# Patient Record
Sex: FEMALE | Race: WHITE | NOT HISPANIC OR LATINO | Employment: FULL TIME | URBAN - METROPOLITAN AREA
[De-identification: names, ages, dates, MRNs, and addresses within clinical notes are randomized per-mention and may not be internally consistent; named-entity substitution may affect disease eponyms.]

---

## 2019-10-24 ENCOUNTER — TELEPHONE (OUTPATIENT)
Dept: MATERNAL FETAL MEDICINE | Facility: CLINIC | Age: 29
End: 2019-10-24

## 2019-10-24 DIAGNOSIS — Q27.0 TWO VESSEL UMBILICAL CORD: ICD-10-CM

## 2019-10-24 DIAGNOSIS — Z36.89 ENCOUNTER FOR FETAL ANATOMIC SURVEY: Primary | ICD-10-CM

## 2019-10-24 NOTE — TELEPHONE ENCOUNTER
Called to schedule patient for appt. No answer at this time. LM for patient to return call to [phone]

## 2019-11-12 ENCOUNTER — TELEPHONE (OUTPATIENT)
Dept: MATERNAL FETAL MEDICINE | Facility: CLINIC | Age: 29
End: 2019-11-12

## 2019-11-12 NOTE — TELEPHONE ENCOUNTER
Reminder call made to patient. No answer at this time. LM for patient to arrive at 1315. Instructed her to call [phone] for any questions.

## 2019-11-13 ENCOUNTER — OFFICE VISIT (OUTPATIENT)
Dept: MATERNAL FETAL MEDICINE | Facility: CLINIC | Age: 29
End: 2019-11-13
Payer: COMMERCIAL

## 2019-11-13 ENCOUNTER — TELEPHONE (OUTPATIENT)
Dept: PEDIATRIC CARDIOLOGY | Facility: CLINIC | Age: 29
End: 2019-11-13

## 2019-11-13 VITALS
DIASTOLIC BLOOD PRESSURE: 70 MMHG | WEIGHT: 237 LBS | HEIGHT: 60 IN | SYSTOLIC BLOOD PRESSURE: 124 MMHG | BODY MASS INDEX: 46.53 KG/M2

## 2019-11-13 DIAGNOSIS — Q27.0 TWO VESSEL UMBILICAL CORD: ICD-10-CM

## 2019-11-13 DIAGNOSIS — D25.9 UTERINE FIBROID IN PREGNANCY: ICD-10-CM

## 2019-11-13 DIAGNOSIS — O09.899 SINGLE UMBILICAL ARTERY AFFECTING MANAGEMENT OF MOTHER IN SINGLETON PREGNANCY, ANTEPARTUM: ICD-10-CM

## 2019-11-13 DIAGNOSIS — O34.10 UTERINE FIBROID IN PREGNANCY: ICD-10-CM

## 2019-11-13 DIAGNOSIS — Z36.89 ENCOUNTER FOR FETAL ANATOMIC SURVEY: ICD-10-CM

## 2019-11-13 DIAGNOSIS — Z36.89 ENCOUNTER FOR ULTRASOUND TO ASSESS FETAL GROWTH: Primary | ICD-10-CM

## 2019-11-13 PROCEDURE — 3008F BODY MASS INDEX DOCD: CPT | Mod: ,,, | Performed by: OBSTETRICS & GYNECOLOGY

## 2019-11-13 PROCEDURE — 76811 PR US, OB FETAL EVAL & EXAM, TRANSABDOM,FIRST GESTATION: ICD-10-PCS | Mod: ,,, | Performed by: OBSTETRICS & GYNECOLOGY

## 2019-11-13 PROCEDURE — 99202 OFFICE O/P NEW SF 15 MIN: CPT | Mod: 25,,, | Performed by: OBSTETRICS & GYNECOLOGY

## 2019-11-13 PROCEDURE — 76811 OB US DETAILED SNGL FETUS: CPT | Mod: ,,, | Performed by: OBSTETRICS & GYNECOLOGY

## 2019-11-13 PROCEDURE — 3008F PR BODY MASS INDEX (BMI) DOCUMENTED: ICD-10-PCS | Mod: ,,, | Performed by: OBSTETRICS & GYNECOLOGY

## 2019-11-13 PROCEDURE — 99202 PR OFFICE/OUTPT VISIT, NEW, LEVL II, 15-29 MIN: ICD-10-PCS | Mod: 25,,, | Performed by: OBSTETRICS & GYNECOLOGY

## 2019-11-13 NOTE — TELEPHONE ENCOUNTER
Spoke with patient via telephone. Fetal echo booked for 11/22 @ 115 pm [facility]. Office number and address verified.

## 2019-11-13 NOTE — LETTER
November 13, 2019      Rosalba Palumbo MD  [address] [facility] [place] [address]           [facility]  [address] , [address]  [place] [address]  Phone: [phone]          Patient: Jimena Rosa   MR Number: [id number]   YOB: 1990   Date of Visit: 11/13/2019       Dear Dr. Rosalba Palumbo:    Thank you for referring Jimena Rosa to me for evaluation. Attached you will find relevant portions of my assessment and plan of care.    If you have questions, please do not hesitate to call me. I look forward to following Jimena Rosa along with you.    Sincerely,    Sage Carrasco MD    Enclosure  CC:  No Recipients    If you would like to receive this communication electronically, please contact [email or web address] or [phone] to request more information on [facility] access.    For providers and/or their staff who would like to refer a patient to [facility], please contact us through our one-stop-shop provider referral line, [facility], at [phone].    If you feel you have received this communication in error or would no longer like to receive these types of communications, please e-mail [email or web address]

## 2019-11-13 NOTE — PROGRESS NOTES
Chief complaint: SUA, Fibroids in pregnancy    Provider requesting consultation: Dr. Palumbo    28 y.o. at 25w3d EGA     PMH:  Past Medical History:   Diagnosis Date    ADHD (attention deficit hyperactivity disorder)     Asthma     Depression     Fibroid        PObHx:  OB History    Para Term  AB Living   1             SAB TAB Ectopic Multiple Live Births                  # Outcome Date GA Lbr Jayson/2nd Weight Sex Delivery Anes PTL Lv   1 Current                PSH:History reviewed. No pertinent surgical history.    Family history:family history includes Cancer in her paternal grandmother; Diabetes in her maternal grandfather; Hypertension in her maternal grandfather; Hypothyroidism in her mother.    Social history: reports that she quit smoking about 5 years ago. She quit after 8.00 years of use. She has never used smokeless tobacco. She reports that she does not drink alcohol or use drugs.    A detailed fetal anatomical ultrasound was completed today.  See details in imaging section of [facility].    The patient was seen because of a Single Umbilical Artery noted on outside ultrasound and during her ultrasound at today's visit.   No other structural abnormalities were noted. Please see the ultrasound report for a full report.    Single umbilical artery occurs in approximately 0.5% to 1.0% of pregnancies. We discussed that the vessels of the umbilical cord form early in pregnancy between the 13th and 38th days after conception. The most common explanation for a single umbilical artery is secondary atrophy of one of the arteries. SUA can be associated with other congenital anomalies, genetic syndromes, or chromosome abnormalities. We discussed that the finding of a BURRIS A with no other congenital anomalies in the fetus is not associated with an increased risk of chromosomal abnormalities: therefore, invasive prenatal testing is not indicated. There have been reports of an increased incidence of congenital  heart defects when a SUA is seen; therefore, fetal echocardiography is warranted. Also, approximately 7-10% of fetuses with an SUA and no other abnormality seen on prenatal US have an anomaly discovered after birth. Therefore it was recommended that the baby be evaluated after birth for any abnormality.  Records of the patient's visits will be sent to the pediatrician of her choice. Fetuses with a SUA can also have IUGR, therefore this pregnancy should be followed with serial US's for fetal growth.    She has been scheduled for a follow-up ultrasound in 5 weeks and for a fetal echocardiogram in 2 weeks.    We also discussed her fibroid uterus. The patient was counseled that fibroids may remain stable or increase in size.  The patient was counseled that some fibroids may undergo degeneration and cause severe abdominal pain and require hospitalization for pain control.  The patient was counseled that large fibroids may be associated with obstruction to labor or fetal malpresentation.  The patient was counseled that retroplacental fibroids may be associated with fetal growth restriction (FGR).  I recommend that the patient have serial fetal growth ultrasounds every 4 weeks, starting at approximately 28-32 weeks.     The approximate physician face-to-face time was 15 minutes. The majority of the time (>50%) was spent on counseling of the patient or coordination of care.  The patient was given an opportunity to ask questions about management and the diease process.  She expressed an understanding of and agreement to the above impression and plan. All questions were answered to her satisfaction.  She was given contact information to the [facility] clinic to address further concerns.

## 2019-11-19 DIAGNOSIS — O99.212 OBESITY AFFECTING PREGNANCY IN SECOND TRIMESTER: Primary | ICD-10-CM

## 2019-11-22 ENCOUNTER — OFFICE VISIT (OUTPATIENT)
Dept: PEDIATRIC CARDIOLOGY | Facility: CLINIC | Age: 29
End: 2019-11-22
Payer: COMMERCIAL

## 2019-11-22 VITALS
HEIGHT: 60 IN | DIASTOLIC BLOOD PRESSURE: 60 MMHG | SYSTOLIC BLOOD PRESSURE: 127 MMHG | WEIGHT: 234.81 LBS | HEART RATE: 90 BPM | BODY MASS INDEX: 46.1 KG/M2

## 2019-11-22 DIAGNOSIS — Z03.73 FETAL ANOMALY SUSPECTED BUT NOT FOUND: ICD-10-CM

## 2019-11-22 DIAGNOSIS — O09.899 SINGLE UMBILICAL ARTERY AFFECTING MANAGEMENT OF MOTHER IN SINGLETON PREGNANCY, ANTEPARTUM: Primary | ICD-10-CM

## 2019-11-22 PROCEDURE — 3008F BODY MASS INDEX DOCD: CPT | Mod: ,,, | Performed by: PEDIATRICS

## 2019-11-22 PROCEDURE — 3008F PR BODY MASS INDEX (BMI) DOCUMENTED: ICD-10-PCS | Mod: ,,, | Performed by: PEDIATRICS

## 2019-11-22 PROCEDURE — 99203 OFFICE O/P NEW LOW 30 MIN: CPT | Mod: 25,,, | Performed by: PEDIATRICS

## 2019-11-22 PROCEDURE — 99203 PR OFFICE/OUTPT VISIT, NEW, LEVL III, 30-44 MIN: ICD-10-PCS | Mod: 25,,, | Performed by: PEDIATRICS

## 2019-11-22 NOTE — PROGRESS NOTES
[facility] - Pediatric Cardiology Fetal Cardiology Clinic    Today, I had the pleasure of evaluating Jimena Rosa who is now 28 y.o. and carrying her first pregnancy at 26 5/7 weeks gestation with an AMELIA of 20. She was referred for evaluation of the fetal heart due history of a two vessel umbilical cord.      She is carrying a female fetus, yet unnamed.      Obstetric History:    .  Her OB care is by Dr. Rosalba Palumbo.  Her MFM care is by Dr. Carrasco.    Past Medical History:   Diagnosis Date    ADHD (attention deficit hyperactivity disorder)     Asthma     Depression     Fibroid          Current Outpatient Medications:     prenatal vit/iron fum/folic ac (PRENATAL 1+1 ORAL), Take by mouth., Disp: , Rfl:     albuterol (VENTOLIN HFA) 90 mcg/actuation inhaler, Inhale 2 puffs into the lungs every 6 (six) hours as needed for Wheezing. Rescue, Disp: 18 g, Rfl: 0    cefdinir (OMNICEF) 300 MG capsule, , Disp: , Rfl:     desvenlafaxine succinate (PRISTIQ) 100 MG Tb24, Take 1 tablet (100 mg total) by mouth once daily. (Patient not taking: Reported on 2019), Disp: 90 tablet, Rfl: 3    fluticasone-vilanterol (BREO) 200-25 mcg/dose DsDv diskus inhaler, Inhale 1 puff into the lungs once daily. (Patient not taking: Reported on 2019), Disp: 1 each, Rfl: 5    methylphenidate HCl (RITALIN) 20 MG tablet, Take 1 tablet (20 mg total) by mouth 3 (three) times daily with meals. (Patient not taking: Reported on 2019), Disp: 90 tablet, Rfl: 0    methylphenidate HCl (RITALIN) 20 MG tablet, Take 1 tablet (20 mg total) by mouth 3 (three) times daily with meals. (Patient not taking: Reported on 2019), Disp: 90 tablet, Rfl: 0    methylphenidate HCl (RITALIN) 20 MG tablet, Take 1 tablet (20 mg total) by mouth 3 (three) times daily with meals. RITALIN 20 MG TABS (Patient not taking: Reported on 2019), Disp: 90 tablet, Rfl: 0    Family History: Negative for congenital heart disease, early  coronary artery disease, sudden unexplained death, connective tissues disorders, genetic syndromes, multiple miscarriages or other congenital anomalies.    Social History: Ms. Rosa is single. The father of the baby is not involved.     FETAL ECHOCARDIOGRAM (summary):  Fetal echocardiogram at 26 5/7 weeks gestation for a history of a two vessel umbilical cord. AMELIA 20.  Normally connected heart.  No ectopy or sustained arrhythmia demonstrated throughout the study.  Normal fetal atrial and ductal level shunting.  No ventricular level shunting.  Normal atrioventricular and semilunar valve structure and function.  Normal ductal and aortic arches.  Normal biventricular size and systolic function.  No pericardial effusion.  (Full report in electronic medical record)    Impression:  Single active female fetus at 26 wga.  Normal fetal echocardiogram.      Todays fetal echocardiogram is normal, within the limitations of fetal echocardiography.  I discussed with her that fetal echocardiography is insufficiently sensitive to rule out all septal defects, anomalies of pulmonary and systemic veins, arch anomalies, and some valvar abnormalities, nor can it ensure that the ductus arteriosus and foramen ovale will spontaneously close.     Recommendations:  Location, timing, and mode of delivery will be determined by the obstetrical team.  She does not require further follow-up in the fetal echocardiography clinic, but I would be happy to see her again if additional questions or concerns arise.    Should there be any concerns about the baby's heart after birth, a post- echocardiogram and cardiology consultation are recommended.     The above information was discussed in detail including the use of diagrams, with 30 minutes of total face to face time, with greater than 50% with counseling and coordination of care.  The discussion of the diagnosis and treatment options is as described above.      Gunner Rodrigues MD,  MPH  Pediatric and Fetal Cardiology  [facility]   [address] [place] [address]    Office: [phone]  Cell: [phone]

## 2019-12-17 ENCOUNTER — PROCEDURE VISIT (OUTPATIENT)
Dept: MATERNAL FETAL MEDICINE | Facility: CLINIC | Age: 29
End: 2019-12-17
Payer: COMMERCIAL

## 2019-12-17 VITALS — SYSTOLIC BLOOD PRESSURE: 117 MMHG | WEIGHT: 237 LBS | DIASTOLIC BLOOD PRESSURE: 59 MMHG | BODY MASS INDEX: 46.29 KG/M2

## 2019-12-17 DIAGNOSIS — D25.9 UTERINE FIBROID IN PREGNANCY: ICD-10-CM

## 2019-12-17 DIAGNOSIS — O09.899 TWO VESSEL UMBILICAL CORD IN SINGLETON PREGNANCY, ANTEPARTUM: ICD-10-CM

## 2019-12-17 DIAGNOSIS — Q27.0 TWO VESSEL UMBILICAL CORD: ICD-10-CM

## 2019-12-17 DIAGNOSIS — Z36.89 ENCOUNTER FOR ULTRASOUND TO ASSESS FETAL GROWTH: ICD-10-CM

## 2019-12-17 DIAGNOSIS — O34.10 UTERINE FIBROID IN PREGNANCY: ICD-10-CM

## 2019-12-17 PROCEDURE — 76816 OB US FOLLOW-UP PER FETUS: CPT | Mod: ,,, | Performed by: OBSTETRICS & GYNECOLOGY

## 2019-12-17 PROCEDURE — 76816 PR  US,PREGNANT UTERUS,F/U,TRANSABD APP: ICD-10-PCS | Mod: ,,, | Performed by: OBSTETRICS & GYNECOLOGY

## 2023-10-13 ENCOUNTER — OFFICE VISIT (OUTPATIENT)
Dept: URGENT CARE | Facility: CLINIC | Age: 33
End: 2023-10-13
Payer: COMMERCIAL

## 2023-10-13 VITALS
BODY MASS INDEX: 39.27 KG/M2 | DIASTOLIC BLOOD PRESSURE: 78 MMHG | RESPIRATION RATE: 18 BRPM | SYSTOLIC BLOOD PRESSURE: 126 MMHG | TEMPERATURE: 99 F | HEIGHT: 60 IN | HEART RATE: 86 BPM | WEIGHT: 200 LBS | OXYGEN SATURATION: 97 %

## 2023-10-13 DIAGNOSIS — L25.9 CONTACT DERMATITIS, UNSPECIFIED CONTACT DERMATITIS TYPE, UNSPECIFIED TRIGGER: Primary | ICD-10-CM

## 2023-10-13 PROCEDURE — 99203 OFFICE O/P NEW LOW 30 MIN: CPT | Mod: S$GLB,,, | Performed by: NURSE PRACTITIONER

## 2023-10-13 PROCEDURE — 99203 PR OFFICE/OUTPT VISIT, NEW, LEVL III, 30-44 MIN: ICD-10-PCS | Mod: S$GLB,,, | Performed by: NURSE PRACTITIONER

## 2023-10-13 RX ORDER — PREDNISONE 10 MG/1
10 TABLET ORAL DAILY
Qty: 4 TABLET | Refills: 0 | Status: SHIPPED | OUTPATIENT
Start: 2023-10-13 | End: 2023-10-17

## 2023-10-13 RX ORDER — DOXYCYCLINE 100 MG/1
100 CAPSULE ORAL EVERY 12 HOURS
Qty: 14 CAPSULE | Refills: 0 | Status: SHIPPED | OUTPATIENT
Start: 2023-10-13 | End: 2023-10-20

## 2023-10-13 NOTE — PROGRESS NOTES
Subjective:       Patient ID: Jimena Rosa is a 32 y.o. female.    Vitals:  height is 5' (1.524 m) and weight is 90.7 kg (200 lb). Her oral temperature is 98.8 °F (37.1 °C). Her blood pressure is 126/78 and her pulse is 86. Her respiration is 18 and oxygen saturation is 97%.     Chief Complaint: Rash (Posterior left hand started to itch x 2 days ago.  States she scratched it and looked down and saw 2 bumps.  Now its spreading.)    This is a 32 y.o. female who presents today with a chief complaint of Posterior left hand started to itch x 2 days ago.  States she scratched it and looked down and saw 2 bumps.  Now its spreading.  Patient presents with:  Rash: Posterior left hand started to itch x 2 days ago.  States she scratched it and looked down and saw 2 bumps.  Now its spreading.         Rash  This is a new problem. The current episode started in the past 7 days. The affected locations include the left hand. The rash is characterized by redness, itchiness and burning. Past treatments include nothing. The treatment provided no relief.       Skin:  Positive for rash.           Objective:      Physical Exam   Constitutional: She is oriented to person, place, and time. obesity  HENT:   Head: Normocephalic and atraumatic.   Eyes: Conjunctivae are normal. Extraocular movement intact   Neck: Neck supple.   Cardiovascular: Normal rate, regular rhythm, normal heart sounds and normal pulses.   Pulmonary/Chest: Effort normal and breath sounds normal.   Abdominal: Normal appearance.   Musculoskeletal: Normal range of motion.         General: Normal range of motion.   Neurological: She is alert and oriented to person, place, and time.   Skin: Skin is warm and dry.         Comments: Small, mildly erythematous, maculopapular rash to back of left hand.   Psychiatric: Her behavior is normal.   Vitals reviewed.        Past medical history and current medications reviewed.       Assessment:           1. Contact dermatitis,  unspecified contact dermatitis type, unspecified trigger              Plan:         Contact dermatitis, unspecified contact dermatitis type, unspecified trigger  -     doxycycline (VIBRAMYCIN) 100 MG Cap; Take 1 capsule (100 mg total) by mouth every 12 (twelve) hours. for 7 days  Dispense: 14 capsule; Refill: 0  -     predniSONE (DELTASONE) 10 MG tablet; Take 1 tablet (10 mg total) by mouth once daily. for 4 days  Dispense: 4 tablet; Refill: 0             There are no Patient Instructions on file for this visit.

## 2024-02-05 ENCOUNTER — OFFICE VISIT (OUTPATIENT)
Dept: URGENT CARE | Facility: CLINIC | Age: 34
End: 2024-02-05
Payer: COMMERCIAL

## 2024-02-05 VITALS
HEIGHT: 60 IN | SYSTOLIC BLOOD PRESSURE: 136 MMHG | RESPIRATION RATE: 18 BRPM | WEIGHT: 268 LBS | OXYGEN SATURATION: 97 % | DIASTOLIC BLOOD PRESSURE: 82 MMHG | TEMPERATURE: 98 F | HEART RATE: 94 BPM | BODY MASS INDEX: 52.61 KG/M2

## 2024-02-05 DIAGNOSIS — L23.9 ALLERGIC DERMATITIS: Primary | ICD-10-CM

## 2024-02-05 PROCEDURE — 99213 OFFICE O/P EST LOW 20 MIN: CPT | Mod: S$GLB,,,

## 2024-02-05 RX ORDER — MUPIROCIN 20 MG/G
OINTMENT TOPICAL 3 TIMES DAILY
Qty: 30 G | Refills: 0 | Status: SHIPPED | OUTPATIENT
Start: 2024-02-05

## 2024-02-05 RX ORDER — AMOXICILLIN AND CLAVULANATE POTASSIUM 875; 125 MG/1; MG/1
1 TABLET, FILM COATED ORAL EVERY 12 HOURS
Qty: 14 TABLET | Refills: 0 | Status: SHIPPED | OUTPATIENT
Start: 2024-02-05 | End: 2024-02-12

## 2024-02-05 NOTE — PROGRESS NOTES
Subjective:       Patient ID: Jimena Rosa is a 33 y.o. female.    Vitals:  height is 5' (1.524 m) and weight is 121.6 kg (268 lb). Her oral temperature is 98.3 °F (36.8 °C). Her blood pressure is 136/82 and her pulse is 94. Her respiration is 18 and oxygen saturation is 97%.     Chief Complaint: Rash (Patient reports rash and whiteheads under both arms x 1-2 months. Patient states it comes and goes.)    This is a 33 y.o. female who presents today with a chief complaint of  Patient presents with:  Rash: Patient reports rash and whiteheads under both arms x 1-2 months. Patient states it comes and goes.        Rash  This is a recurrent problem. The current episode started more than 1 month ago. The problem is unchanged. The affected locations include the right axilla and left axilla. The rash is characterized by itchiness and redness. It is unknown if there was an exposure to a precipitant.       Constitution: Negative.   HENT: Negative.     Neck: neck negative.   Cardiovascular: Negative.    Eyes: Negative.    Respiratory: Negative.     Gastrointestinal: Negative.    Endocrine: negative.   Genitourinary: Negative.    Musculoskeletal: Negative.    Skin:  Positive for rash.   Allergic/Immunologic: Negative.    Neurological: Negative.    Hematologic/Lymphatic: Negative.    Psychiatric/Behavioral: Negative.             Objective:      Physical Exam   Constitutional: She is oriented to person, place, and time.   HENT:   Head: Normocephalic and atraumatic.   Nose: Nose normal.   Mouth/Throat: Mucous membranes are moist.   Eyes: Conjunctivae are normal. Pupils are equal, round, and reactive to light. Extraocular movement intact   Cardiovascular: Normal rate and normal pulses.   Pulmonary/Chest: Effort normal.   Abdominal: Normal appearance.   Musculoskeletal: Normal range of motion.         General: Normal range of motion.   Neurological: no focal deficit. She is alert, oriented to person, place, and time and at  baseline.   Skin: Skin is rash.         Comments: Bilateral under arm rash noted   Psychiatric: Her behavior is normal. Mood, judgment and thought content normal.   Nursing note and vitals reviewed.        Past medical history and current medications reviewed.       Assessment:           1. Allergic dermatitis              Plan:         Allergic dermatitis  -     Ambulatory referral/consult to Dermatology  -     amoxicillin-clavulanate 875-125mg (AUGMENTIN) 875-125 mg per tablet; Take 1 tablet by mouth every 12 (twelve) hours. for 7 days  Dispense: 14 tablet; Refill: 0  -     mupirocin (BACTROBAN) 2 % ointment; Apply topically 3 (three) times daily.  Dispense: 30 g; Refill: 0             Patient Instructions   Keep areas clean and dry.  Avoid scratching areas and perform good hand hygiene.    May take Benadryl OTC as directed.    Keep the areas covered and avoid contact with others.  Follow-up with PCP or dermatology if no improvement in symptoms or for any worsening of symptoms.    Keep areas clean and dry.  Avoid scratching areas and perform good hand hygiene.    May take Benadryl OTC as directed.    Keep the areas covered and avoid contact with others.  Follow-up with PCP or dermatology if no improvement in symptoms or for any worsening of symptoms.

## 2024-02-06 NOTE — PATIENT INSTRUCTIONS
Keep areas clean and dry.  Avoid scratching areas and perform good hand hygiene.    May take Benadryl OTC as directed.    Keep the areas covered and avoid contact with others.  Follow-up with PCP or dermatology if no improvement in symptoms or for any worsening of symptoms.    Keep areas clean and dry.  Avoid scratching areas and perform good hand hygiene.    May take Benadryl OTC as directed.    Keep the areas covered and avoid contact with others.  Follow-up with PCP or dermatology if no improvement in symptoms or for any worsening of symptoms.

## 2024-04-29 ENCOUNTER — OFFICE VISIT (OUTPATIENT)
Dept: URGENT CARE | Facility: CLINIC | Age: 34
End: 2024-04-29
Payer: COMMERCIAL

## 2024-04-29 VITALS
SYSTOLIC BLOOD PRESSURE: 128 MMHG | RESPIRATION RATE: 19 BRPM | HEART RATE: 89 BPM | OXYGEN SATURATION: 96 % | WEIGHT: 256.19 LBS | BODY MASS INDEX: 50.3 KG/M2 | DIASTOLIC BLOOD PRESSURE: 86 MMHG | HEIGHT: 60 IN | TEMPERATURE: 98 F

## 2024-04-29 DIAGNOSIS — J02.9 SORE THROAT: ICD-10-CM

## 2024-04-29 DIAGNOSIS — J02.9 PHARYNGITIS, UNSPECIFIED ETIOLOGY: Primary | ICD-10-CM

## 2024-04-29 LAB
CTP QC/QA: YES
MOLECULAR STREP A: NEGATIVE

## 2024-04-29 PROCEDURE — 87651 STREP A DNA AMP PROBE: CPT | Mod: QW,,, | Performed by: NURSE PRACTITIONER

## 2024-04-29 PROCEDURE — 99213 OFFICE O/P EST LOW 20 MIN: CPT | Mod: S$GLB,,, | Performed by: NURSE PRACTITIONER

## 2024-04-29 NOTE — PATIENT INSTRUCTIONS
Please return here or go to the Emergency Department for any concerns or worsening of condition.  Please drink plenty of fluids.  Please get plenty of rest.  If you were prescribed antibiotics, please take them to completion.  If you do not have Hypertension or any history of palpitations, it is ok to take over the counter Sudafed or Mucinex D or Allegra-D or Claritin-D or Zyrtec-D.  If you do take one of the above, it is ok to combine that with plain over the counter Mucinex or Allegra or Claritin or Zyrtec.  If for example you are taking Zyrtec -D, you can combine that with Mucinex, but not Mucinex-D.  If you are taking Mucinex-D, you can combine that with plain Allegra or Claritin or Zyrtec.   If you do have Hypertension or palpitations, it is safe to take Coricidin HBP for relief of sinus symptoms.  If not allergic, please take over the counter Tylenol (Acetaminophen) and/or Motrin (Ibuprofen) as directed for control of pain and/or fever.  Please follow up with your primary care doctor or specialist as needed.    If you  smoke, please stop smoking.  \

## 2024-04-29 NOTE — PROGRESS NOTES
Subjective:      Patient ID: Jimena Rosa is a 33 y.o. female.    Vitals:  height is 5' (1.524 m) and weight is 116.2 kg (256 lb 2.8 oz). Her oral temperature is 98.2 °F (36.8 °C). Her blood pressure is 128/86 and her pulse is 89. Her respiration is 19 and oxygen saturation is 96%.     Chief Complaint: Sore Throat    This is a 33 y.o. female who presents today with a chief complaint of sore throat.       Patient presents with:  Sore Throat      Patient reports sore throat that is worse with swallowing upon waking this morning.  Patient denies other symptoms, and patient denies taking any medications.      Sore Throat   This is a new problem. The current episode started today. The problem has been gradually worsening. Neither side of throat is experiencing more pain than the other. There has been no fever. The pain is at a severity of 4/10. The pain is mild. Associated symptoms include trouble swallowing. Pertinent negatives include no congestion or headaches. She has had no exposure to strep or mono. She has tried nothing for the symptoms. The treatment provided no relief.       Constitution: Negative.   HENT:  Positive for sore throat and trouble swallowing. Negative for congestion.    Respiratory: Negative.     Neurological:  Negative for headaches.      Objective:     Physical Exam   Constitutional: She is oriented to person, place, and time. She appears well-developed. She is cooperative.   HENT:   Head: Normocephalic and atraumatic.   Ears:   Right Ear: Hearing, tympanic membrane, external ear and ear canal normal.   Left Ear: Hearing, tympanic membrane, external ear and ear canal normal.   Nose: Nose normal. No mucosal edema or nasal deformity. No epistaxis. Right sinus exhibits no maxillary sinus tenderness and no frontal sinus tenderness. Left sinus exhibits no maxillary sinus tenderness and no frontal sinus tenderness.   Mouth/Throat: Uvula is midline and mucous membranes are normal. No trismus in  the jaw. Normal dentition. No uvula swelling. Posterior oropharyngeal erythema (mild) present.   Eyes: Conjunctivae and lids are normal.   Neck: Trachea normal and phonation normal. Neck supple.   Cardiovascular: Normal rate, regular rhythm, normal heart sounds and normal pulses.   Pulmonary/Chest: Effort normal and breath sounds normal.   Abdominal: Normal appearance and bowel sounds are normal. Soft.   Musculoskeletal: Normal range of motion.         General: Normal range of motion.   Neurological: She is alert and oriented to person, place, and time. She exhibits normal muscle tone.   Skin: Skin is warm, dry and intact.   Psychiatric: Her speech is normal and behavior is normal. Judgment and thought content normal.   Nursing note and vitals reviewed.      Results for orders placed or performed in visit on 04/29/24   POCT Strep A, Molecular   Result Value Ref Range    Molecular Strep A, POC Negative Negative     Acceptable Yes       Assessment:     1. Pharyngitis, unspecified etiology    2. Sore throat        Plan:       Pharyngitis, unspecified etiology    Sore throat  -     POCT Strep A, Molecular      Patient Instructions   Please return here or go to the Emergency Department for any concerns or worsening of condition.  Please drink plenty of fluids.  Please get plenty of rest.  If you were prescribed antibiotics, please take them to completion.  If you do not have Hypertension or any history of palpitations, it is ok to take over the counter Sudafed or Mucinex D or Allegra-D or Claritin-D or Zyrtec-D.  If you do take one of the above, it is ok to combine that with plain over the counter Mucinex or Allegra or Claritin or Zyrtec.  If for example you are taking Zyrtec -D, you can combine that with Mucinex, but not Mucinex-D.  If you are taking Mucinex-D, you can combine that with plain Allegra or Claritin or Zyrtec.   If you do have Hypertension or palpitations, it is safe to take Coricidin HBP for  relief of sinus symptoms.  If not allergic, please take over the counter Tylenol (Acetaminophen) and/or Motrin (Ibuprofen) as directed for control of pain and/or fever.  Please follow up with your primary care doctor or specialist as needed.    If you  smoke, please stop smoking.  \        Charlie Lewis, CHRISSY-C     Medical Decision Making:   Urgent Care Management:  Patient's exam negative in the clinic and symptoms present for less than 12 hours, therefore I recommend supportive treatment with medication OTC for sore throat relief and patient will monitor symptoms and return to clinic if no improvement or for any worsening of symptoms.